# Patient Record
(demographics unavailable — no encounter records)

---

## 2024-11-18 NOTE — PHYSICAL EXAM
[No Respiratory Distress] : no respiratory distress  [No Accessory Muscle Use] : no accessory muscle use [Normal] : affect was normal and insight and judgment were intact [de-identified] : rhonchi auscultated over RLL

## 2024-11-18 NOTE — PLAN
[FreeTextEntry1] : - normal saline nasal spray, flonase nasal spray for post nasal drip that's contributing to the cough  - deep breathing exercises  - chest XRay   Based on examination, there are no acute contraindications to proceeding with above listed surgery at this time. Patient was advised to avoid all NSAIDs/ vitamins/ supplements for 1 week prior to surgery. She may resume these post-operatively when advised to do so by her surgeon. Reviewed risks of constipation and DVT post operatively and ways to decrease risk for these issues, including limiting  frequency and duration of opioid medication, increasing  fluid and fiber intake, early and frequent mobilization, and if necessary using Miralax and/or Dulcolax (short term).

## 2024-11-18 NOTE — HISTORY OF PRESENT ILLNESS
[No Pertinent Cardiac History] : no history of aortic stenosis, atrial fibrillation, coronary artery disease, recent myocardial infarction, or implantable device/pacemaker [No Pertinent Pulmonary History] : no history of asthma, COPD, sleep apnea, or smoking [No Adverse Anesthesia Reaction] : no adverse anesthesia reaction in self or family member [(Patient denies any chest pain, claudication, dyspnea on exertion, orthopnea, palpitations or syncope)] : Patient denies any chest pain, claudication, dyspnea on exertion, orthopnea, palpitations or syncope [Chronic Anticoagulation] : no chronic anticoagulation [Chronic Kidney Disease] : no chronic kidney disease [Diabetes] : no diabetes [FreeTextEntry1] : Facial cosmetic surgery  [FreeTextEntry2] : 11/21/24 [FreeTextEntry3] : Dr. Barcenas  [FreeTextEntry4] : Patient is a 67yo female presenting for a preop clearance for a facial cosmetic surgery scheduled on 11/21 with Dr. Barcenas. PMH includes HLD, hypothyroidism, anxiety and depression, leukopenia, recurrent cold sores. - needs labs and UA today  - EKG form physical 9/13/24 will be accepted  - states that had a cold 2 weeks ago, has a lingering cough - non productive - denies fever, SOB  - took a z snehal because was going to [place] when it started

## 2024-11-18 NOTE — PHYSICAL EXAM
[No Respiratory Distress] : no respiratory distress  [No Accessory Muscle Use] : no accessory muscle use [Normal] : affect was normal and insight and judgment were intact [de-identified] : rhonchi auscultated over RLL

## 2025-07-11 NOTE — ASSESSMENT
[FreeTextEntry1] : CAROLINA HUI is a 68 year old female here for eval of R hip and neck as well as ?small (1 cm) lymph node vs lateral/superior edge for hyoid bone  -Check labs, CBC, ESR, CRP, Lyme -Neck/soft tissue US. If labs are perfect and area of concern left neck is not increasing in size or causing other new sxs can defer on US if prefers. If abnormal labs or if incr/new/persistent sxs she will do neck US. -ENT referral if labs/US not revealing and still concerned about area (eg if incr/new/persistent sxs)

## 2025-07-11 NOTE — PHYSICAL EXAM
[No Acute Distress] : no acute distress [Well Nourished] : well nourished [Well Developed] : well developed [Well-Appearing] : well-appearing [Normal Sclera/Conjunctiva] : normal sclera/conjunctiva [EOMI] : extraocular movements intact [Normal Outer Ear/Nose] : the outer ears and nose were normal in appearance [Normal Oropharynx] : the oropharynx was normal [No JVD] : no jugular venous distention [No Lymphadenopathy] : no lymphadenopathy [Supple] : supple [Thyroid Normal, No Nodules] : the thyroid was normal and there were no nodules present [No Respiratory Distress] : no respiratory distress  [No Accessory Muscle Use] : no accessory muscle use [Clear to Auscultation] : lungs were clear to auscultation bilaterally [Normal Rate] : normal rate  [Regular Rhythm] : with a regular rhythm [Normal S1, S2] : normal S1 and S2 [No Murmur] : no murmur heard [No Carotid Bruits] : no carotid bruits [No Abdominal Bruit] : a ~M bruit was not heard ~T in the abdomen [No Varicosities] : no varicosities [Pedal Pulses Present] : the pedal pulses are present [No Edema] : there was no peripheral edema [No Palpable Aorta] : no palpable aorta [No Extremity Clubbing/Cyanosis] : no extremity clubbing/cyanosis [Soft] : abdomen soft [Non Tender] : non-tender [Non-distended] : non-distended [No Masses] : no abdominal mass palpated [No HSM] : no HSM [Normal Bowel Sounds] : normal bowel sounds [Normal Posterior Cervical Nodes] : no posterior cervical lymphadenopathy [Normal Anterior Cervical Nodes] : no anterior cervical lymphadenopathy [No CVA Tenderness] : no CVA  tenderness [No Spinal Tenderness] : no spinal tenderness [No Joint Swelling] : no joint swelling [Grossly Normal Strength/Tone] : grossly normal strength/tone [No Rash] : no rash [Coordination Grossly Intact] : coordination grossly intact [No Focal Deficits] : no focal deficits [Normal Gait] : normal gait [Deep Tendon Reflexes (DTR)] : deep tendon reflexes were 2+ and symmetric [Normal Affect] : the affect was normal [Normal Insight/Judgement] : insight and judgment were intact [de-identified] : BMI 19, wgt stable [de-identified] : thyroid is wnl. I palpate the 1 cm soft rubbery round nodule L of center in anter neck and feels like could be part of her hyoid (she is very think and neck structures are easily visible/palpable). NT  [de-identified] : +TTP over R lateral hip bursa (no subcut tissue/padding as she is very slender)

## 2025-07-11 NOTE — HISTORY OF PRESENT ILLNESS
[FreeTextEntry8] : Kristie is here for eval of lump on the left side of her neck.  She also reports several months of right hip and neck pain. Seeing Jonathan at Dr. Weinberg's office. Had XR C spine and R hip showing mild OA. Says ortho feels more is a bursitis for her hip than an intraarticular issue. Is doing supp care, they are considering poss steroid injection of bursa.    Left anter cerv chain palpable and not sure if is a normal node vs something more. LN present x 1-2 mos. No change in size (initially thought maybe decreasing but now not sure and might be stable; not definitively incr in size though). Initially had a canker sore but was on R side of mouth.  Tend to check for it daily when applying sunscreen/moisturizer.   Son Kings just completed treatment for thyroid cancer. Has Friends with Lymphoma. So wants to be on the safe side. Concerns are for poss systemic issue (eg lymphoma) or infectious issue (eg Lyme)  No fevers. No chronic cough, no wgt loss (has trouble gaining wgt as eats very cleanly, will try to incr caloric intake). No swollen glands/lymph nodes felt elsewhere

## 2025-07-11 NOTE — PLAN
[FreeTextEntry1] : Continue all medications as prescribed. Check labs as above. Will adjust any medications based upon lab results.  Rx for acupuncture given   Recommended Tylenol XS or Arthritis 1-2 pills BID-TID if helpful, ok to use NSAIDs sparingly with food (but revd r/b/se of NSAIDs incl CV, renal and GI and she should limit use as much as possible), regular stretching, heat/ice prn, consider turmeric supplementation, consider CBD cream or oral options, gentle yoga/chair yoga, Pilates, foal roller for IT band, strengthen core muscles, consider chiro and/or massage and/or acupuncture. Follow up with ortho as recommended by them   Discussed clean eating (eg Mediterranean style plant focused whole food eating plan) and regular exercise/staying as physically active as possible. Include balance exercises and strength training and core strengthening exercises for bone health and to decrease risk for falls.  Reviewed importance of good self care (e.g. meditation, yoga, adequate rest, regular exercise, magnesium, clean eating, etc.).

## 2025-07-11 NOTE — REVIEW OF SYSTEMS
[Joint Pain] : joint pain [Anxiety] : anxiety [Depression] : depression [Earache] : no earache [Nasal Discharge] : no nasal discharge [Sore Throat] : no sore throat [Postnasal Drip] : no postnasal drip [Joint Stiffness] : joint stiffness [Suicidal] : not suicidal [Insomnia] : no insomnia [Negative] : Constitutional [FreeTextEntry4] : +cold sores/sores on upper thigh periodically/occas, Valtrex prn is effective [de-identified] : h/o anxiety/depression, doing well on current med regimen and with self care measures

## 2025-07-11 NOTE — HEALTH RISK ASSESSMENT
[No falls in past year] : Patient reported no falls in the past year [0] : 2) Feeling down, depressed, or hopeless: Not at all (0) [PHQ-2 Negative - No further assessment needed] : PHQ-2 Negative - No further assessment needed [Former] : Former [5-9] : 5-9 [> 15 Years] : > 15 Years [[id number]] : 0